# Patient Record
Sex: MALE
[De-identification: names, ages, dates, MRNs, and addresses within clinical notes are randomized per-mention and may not be internally consistent; named-entity substitution may affect disease eponyms.]

---

## 2021-06-21 ENCOUNTER — HOSPITAL ENCOUNTER (OUTPATIENT)
Dept: HOSPITAL 53 - M PLAIMG | Age: 41
End: 2021-06-21
Attending: INTERNAL MEDICINE

## 2021-06-21 DIAGNOSIS — M79.641: Primary | ICD-10-CM

## 2021-06-21 NOTE — REPPI
INDICATION:

BROKEN RIGHT HAND.



COMPARISON:

None.



TECHNIQUE:

Four views



FINDINGS:

The joint spaces are symmetric and relatively well maintained.  There is no evidence

of acute fracture or destructive osseous lesion.





IMPRESSION:

Negative hand.





<Electronically signed by Cole Strong > 06/21/21 [id number]